# Patient Record
Sex: MALE | Race: WHITE | NOT HISPANIC OR LATINO
[De-identification: names, ages, dates, MRNs, and addresses within clinical notes are randomized per-mention and may not be internally consistent; named-entity substitution may affect disease eponyms.]

---

## 2017-05-13 ENCOUNTER — RX RENEWAL (OUTPATIENT)
Age: 27
End: 2017-05-13

## 2017-08-19 ENCOUNTER — RX RENEWAL (OUTPATIENT)
Age: 27
End: 2017-08-19

## 2018-03-09 ENCOUNTER — EMERGENCY (EMERGENCY)
Facility: HOSPITAL | Age: 28
LOS: 1 days | Discharge: HOME | End: 2018-03-09
Admitting: EMERGENCY MEDICINE
Payer: COMMERCIAL

## 2018-03-09 PROCEDURE — 36415 COLL VENOUS BLD VENIPUNCTURE: CPT

## 2018-03-09 PROCEDURE — 87389 HIV-1 AG W/HIV-1&-2 AB AG IA: CPT

## 2018-03-09 PROCEDURE — 93005 ELECTROCARDIOGRAM TRACING: CPT

## 2018-03-09 PROCEDURE — 82150 ASSAY OF AMYLASE: CPT

## 2018-03-09 PROCEDURE — 80307 DRUG TEST PRSMV CHEM ANLYZR: CPT

## 2018-03-09 PROCEDURE — 85730 THROMBOPLASTIN TIME PARTIAL: CPT

## 2018-03-09 PROCEDURE — 81003 URINALYSIS AUTO W/O SCOPE: CPT

## 2018-03-09 PROCEDURE — 80053 COMPREHEN METABOLIC PANEL: CPT

## 2018-03-09 PROCEDURE — 99284 EMERGENCY DEPT VISIT MOD MDM: CPT | Mod: 25

## 2018-03-09 PROCEDURE — 85027 COMPLETE CBC AUTOMATED: CPT

## 2018-03-09 PROCEDURE — 86703 HIV-1/HIV-2 1 RESULT ANTBDY: CPT

## 2018-03-09 PROCEDURE — 80074 ACUTE HEPATITIS PANEL: CPT

## 2018-03-09 PROCEDURE — 99285 EMERGENCY DEPT VISIT HI MDM: CPT | Mod: 25

## 2018-03-09 PROCEDURE — 82550 ASSAY OF CK (CPK): CPT

## 2018-03-09 PROCEDURE — 85610 PROTHROMBIN TIME: CPT

## 2018-03-09 PROCEDURE — 71045 X-RAY EXAM CHEST 1 VIEW: CPT

## 2018-03-09 PROCEDURE — 83605 ASSAY OF LACTIC ACID: CPT

## 2018-03-09 PROCEDURE — 70450 CT HEAD/BRAIN W/O DYE: CPT

## 2018-03-09 PROCEDURE — 83690 ASSAY OF LIPASE: CPT

## 2018-03-09 PROCEDURE — 93010 ELECTROCARDIOGRAM REPORT: CPT

## 2018-03-09 PROCEDURE — 71045 X-RAY EXAM CHEST 1 VIEW: CPT | Mod: 26

## 2018-03-09 PROCEDURE — 70450 CT HEAD/BRAIN W/O DYE: CPT | Mod: 26

## 2018-03-09 PROCEDURE — 82962 GLUCOSE BLOOD TEST: CPT

## 2018-03-12 ENCOUNTER — APPOINTMENT (OUTPATIENT)
Dept: FAMILY MEDICINE | Facility: CLINIC | Age: 28
End: 2018-03-12
Payer: COMMERCIAL

## 2018-03-12 VITALS
DIASTOLIC BLOOD PRESSURE: 70 MMHG | BODY MASS INDEX: 25.71 KG/M2 | HEIGHT: 66 IN | WEIGHT: 160 LBS | RESPIRATION RATE: 20 BRPM | SYSTOLIC BLOOD PRESSURE: 124 MMHG | HEART RATE: 68 BPM

## 2018-03-12 PROCEDURE — 99213 OFFICE O/P EST LOW 20 MIN: CPT | Mod: 25

## 2018-03-12 PROCEDURE — 36415 COLL VENOUS BLD VENIPUNCTURE: CPT

## 2018-03-13 LAB
25(OH)D3 SERPL-MCNC: 16.6 NG/ML
T3FREE SERPL-MCNC: 2.33 PG/ML
T4 FREE SERPL-MCNC: 1.6 NG/DL
TSH SERPL-ACNC: 0.38 UIU/ML

## 2018-03-16 ENCOUNTER — OUTPATIENT (OUTPATIENT)
Dept: OUTPATIENT SERVICES | Facility: HOSPITAL | Age: 28
LOS: 1 days | End: 2018-03-16
Payer: COMMERCIAL

## 2018-03-16 DIAGNOSIS — Z00.00 ENCOUNTER FOR GENERAL ADULT MEDICAL EXAMINATION WITHOUT ABNORMAL FINDINGS: ICD-10-CM

## 2018-03-16 PROCEDURE — 95812 EEG 41-60 MINUTES: CPT

## 2018-03-20 ENCOUNTER — OUTPATIENT (OUTPATIENT)
Dept: OUTPATIENT SERVICES | Facility: HOSPITAL | Age: 28
LOS: 1 days | End: 2018-03-20
Payer: COMMERCIAL

## 2018-03-20 ENCOUNTER — APPOINTMENT (OUTPATIENT)
Dept: MRI IMAGING | Facility: HOSPITAL | Age: 28
End: 2018-03-20
Payer: COMMERCIAL

## 2018-03-20 DIAGNOSIS — Z00.8 ENCOUNTER FOR OTHER GENERAL EXAMINATION: ICD-10-CM

## 2018-03-20 PROCEDURE — 70553 MRI BRAIN STEM W/O & W/DYE: CPT | Mod: 26

## 2018-03-20 PROCEDURE — 70553 MRI BRAIN STEM W/O & W/DYE: CPT

## 2018-03-20 PROCEDURE — A9585: CPT

## 2018-03-30 ENCOUNTER — APPOINTMENT (OUTPATIENT)
Dept: NEUROLOGY | Facility: CLINIC | Age: 28
End: 2018-03-30

## 2018-08-25 ENCOUNTER — EMERGENCY (EMERGENCY)
Facility: HOSPITAL | Age: 28
LOS: 1 days | Discharge: HOME | End: 2018-08-25
Attending: INTERNAL MEDICINE | Admitting: INTERNAL MEDICINE
Payer: COMMERCIAL

## 2018-08-25 VITALS
TEMPERATURE: 98 F | RESPIRATION RATE: 18 BRPM | SYSTOLIC BLOOD PRESSURE: 127 MMHG | OXYGEN SATURATION: 98 % | HEART RATE: 115 BPM | DIASTOLIC BLOOD PRESSURE: 79 MMHG

## 2018-08-25 VITALS
DIASTOLIC BLOOD PRESSURE: 85 MMHG | OXYGEN SATURATION: 99 % | SYSTOLIC BLOOD PRESSURE: 117 MMHG | TEMPERATURE: 98 F | RESPIRATION RATE: 17 BRPM

## 2018-08-25 DIAGNOSIS — R56.9 UNSPECIFIED CONVULSIONS: ICD-10-CM

## 2018-08-25 LAB
ALBUMIN SERPL ELPH-MCNC: 4.3 G/DL — SIGNIFICANT CHANGE UP (ref 3.3–5)
ALP SERPL-CCNC: 62 U/L — SIGNIFICANT CHANGE UP (ref 40–120)
ALT FLD-CCNC: 26 U/L DA — SIGNIFICANT CHANGE UP (ref 10–45)
ANION GAP SERPL CALC-SCNC: 9 MMOL/L — SIGNIFICANT CHANGE UP (ref 5–17)
APTT BLD: 28.7 SEC — SIGNIFICANT CHANGE UP (ref 27.5–37.4)
AST SERPL-CCNC: 22 U/L — SIGNIFICANT CHANGE UP (ref 10–40)
BASOPHILS # BLD AUTO: 0.1 K/UL — SIGNIFICANT CHANGE UP (ref 0–0.2)
BASOPHILS NFR BLD AUTO: 1.1 % — SIGNIFICANT CHANGE UP (ref 0–2)
BILIRUB SERPL-MCNC: 0.8 MG/DL — SIGNIFICANT CHANGE UP (ref 0.2–1.2)
BUN SERPL-MCNC: 16 MG/DL — SIGNIFICANT CHANGE UP (ref 7–23)
CALCIUM SERPL-MCNC: 10.1 MG/DL — SIGNIFICANT CHANGE UP (ref 8.4–10.5)
CHLORIDE SERPL-SCNC: 102 MMOL/L — SIGNIFICANT CHANGE UP (ref 96–108)
CO2 SERPL-SCNC: 27 MMOL/L — SIGNIFICANT CHANGE UP (ref 22–31)
CREAT SERPL-MCNC: 1.01 MG/DL — SIGNIFICANT CHANGE UP (ref 0.5–1.3)
EOSINOPHIL # BLD AUTO: 0 K/UL — SIGNIFICANT CHANGE UP (ref 0–0.5)
EOSINOPHIL NFR BLD AUTO: 0.3 % — SIGNIFICANT CHANGE UP (ref 0–6)
GLUCOSE BLDC GLUCOMTR-MCNC: 94 MG/DL — SIGNIFICANT CHANGE UP (ref 70–99)
GLUCOSE SERPL-MCNC: 111 MG/DL — HIGH (ref 70–99)
HCT VFR BLD CALC: 45.1 % — SIGNIFICANT CHANGE UP (ref 39–50)
HGB BLD-MCNC: 15.1 G/DL — SIGNIFICANT CHANGE UP (ref 13–17)
INR BLD: 1.12 RATIO — SIGNIFICANT CHANGE UP (ref 0.88–1.16)
LYMPHOCYTES # BLD AUTO: 17.4 % — SIGNIFICANT CHANGE UP (ref 13–44)
LYMPHOCYTES # BLD AUTO: 2.1 K/UL — SIGNIFICANT CHANGE UP (ref 1–3.3)
MCHC RBC-ENTMCNC: 30 PG — SIGNIFICANT CHANGE UP (ref 27–34)
MCHC RBC-ENTMCNC: 33.4 GM/DL — SIGNIFICANT CHANGE UP (ref 32–36)
MCV RBC AUTO: 89.6 FL — SIGNIFICANT CHANGE UP (ref 80–100)
MONOCYTES # BLD AUTO: 0.6 K/UL — SIGNIFICANT CHANGE UP (ref 0–0.9)
MONOCYTES NFR BLD AUTO: 4.7 % — SIGNIFICANT CHANGE UP (ref 2–14)
NEUTROPHILS # BLD AUTO: 9.3 K/UL — HIGH (ref 1.8–7.4)
NEUTROPHILS NFR BLD AUTO: 76.4 % — SIGNIFICANT CHANGE UP (ref 43–77)
PLATELET # BLD AUTO: 228 K/UL — SIGNIFICANT CHANGE UP (ref 150–400)
POTASSIUM SERPL-MCNC: 4.1 MMOL/L — SIGNIFICANT CHANGE UP (ref 3.5–5.3)
POTASSIUM SERPL-SCNC: 4.1 MMOL/L — SIGNIFICANT CHANGE UP (ref 3.5–5.3)
PROT SERPL-MCNC: 7.8 G/DL — SIGNIFICANT CHANGE UP (ref 6–8.3)
PROTHROM AB SERPL-ACNC: 12.5 SEC — SIGNIFICANT CHANGE UP (ref 9.8–12.7)
RBC # BLD: 5.03 M/UL — SIGNIFICANT CHANGE UP (ref 4.2–5.8)
RBC # FLD: 12.6 % — SIGNIFICANT CHANGE UP (ref 10.3–14.5)
SODIUM SERPL-SCNC: 138 MMOL/L — SIGNIFICANT CHANGE UP (ref 135–145)
TROPONIN I SERPL-MCNC: <.017 NG/ML — LOW (ref 0.02–0.06)
WBC # BLD: 12.1 K/UL — HIGH (ref 3.8–10.5)
WBC # FLD AUTO: 12.1 K/UL — HIGH (ref 3.8–10.5)

## 2018-08-25 PROCEDURE — 80053 COMPREHEN METABOLIC PANEL: CPT

## 2018-08-25 PROCEDURE — 99284 EMERGENCY DEPT VISIT MOD MDM: CPT | Mod: 25

## 2018-08-25 PROCEDURE — 85610 PROTHROMBIN TIME: CPT

## 2018-08-25 PROCEDURE — 93005 ELECTROCARDIOGRAM TRACING: CPT

## 2018-08-25 PROCEDURE — 84484 ASSAY OF TROPONIN QUANT: CPT

## 2018-08-25 PROCEDURE — 99284 EMERGENCY DEPT VISIT MOD MDM: CPT

## 2018-08-25 PROCEDURE — 82962 GLUCOSE BLOOD TEST: CPT

## 2018-08-25 PROCEDURE — 85027 COMPLETE CBC AUTOMATED: CPT

## 2018-08-25 PROCEDURE — 96365 THER/PROPH/DIAG IV INF INIT: CPT

## 2018-08-25 PROCEDURE — 85730 THROMBOPLASTIN TIME PARTIAL: CPT

## 2018-08-25 PROCEDURE — 93010 ELECTROCARDIOGRAM REPORT: CPT

## 2018-08-25 RX ORDER — SODIUM CHLORIDE 9 MG/ML
1000 INJECTION INTRAMUSCULAR; INTRAVENOUS; SUBCUTANEOUS
Qty: 0 | Refills: 0 | Status: DISCONTINUED | OUTPATIENT
Start: 2018-08-25 | End: 2018-08-29

## 2018-08-25 RX ORDER — LEVOTHYROXINE SODIUM 125 MCG
1 TABLET ORAL
Qty: 0 | Refills: 0 | COMMUNITY

## 2018-08-25 RX ORDER — SODIUM CHLORIDE 9 MG/ML
3 INJECTION INTRAMUSCULAR; INTRAVENOUS; SUBCUTANEOUS ONCE
Qty: 0 | Refills: 0 | Status: COMPLETED | OUTPATIENT
Start: 2018-08-25 | End: 2018-08-25

## 2018-08-25 RX ORDER — LEVETIRACETAM 250 MG/1
1 TABLET, FILM COATED ORAL
Qty: 60 | Refills: 0 | OUTPATIENT
Start: 2018-08-25 | End: 2018-09-23

## 2018-08-25 RX ORDER — LEVETIRACETAM 250 MG/1
1000 TABLET, FILM COATED ORAL ONCE
Qty: 0 | Refills: 0 | Status: COMPLETED | OUTPATIENT
Start: 2018-08-25 | End: 2018-08-25

## 2018-08-25 RX ADMIN — LEVETIRACETAM 1000 MILLIGRAM(S): 250 TABLET, FILM COATED ORAL at 16:00

## 2018-08-25 RX ADMIN — LEVETIRACETAM 400 MILLIGRAM(S): 250 TABLET, FILM COATED ORAL at 15:38

## 2018-08-25 RX ADMIN — SODIUM CHLORIDE 3 MILLILITER(S): 9 INJECTION INTRAMUSCULAR; INTRAVENOUS; SUBCUTANEOUS at 15:01

## 2018-08-25 RX ADMIN — SODIUM CHLORIDE 125 MILLILITER(S): 9 INJECTION INTRAMUSCULAR; INTRAVENOUS; SUBCUTANEOUS at 15:01
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
## 2018-08-25 NOTE — ED ADULT NURSE NOTE - NSIMPLEMENTINTERV_GEN_ALL_ED
Implemented All Universal Safety Interventions:  [place] to call system. Call bell, personal items and telephone within reach. Instruct patient to call for assistance. Room bathroom lighting operational. Non-slip footwear when patient is off stretcher. Physically safe environment: no spills, clutter or unnecessary equipment. Stretcher in lowest position, wheels locked, appropriate side rails in place.

## 2018-08-30 PROBLEM — E07.9 DISORDER OF THYROID, UNSPECIFIED: Chronic | Status: ACTIVE | Noted: 2018-08-25

## 2018-08-31 ENCOUNTER — OUTPATIENT (OUTPATIENT)
Dept: OUTPATIENT SERVICES | Facility: HOSPITAL | Age: 28
LOS: 1 days | End: 2018-08-31
Payer: COMMERCIAL

## 2018-08-31 DIAGNOSIS — R56.9 UNSPECIFIED CONVULSIONS: ICD-10-CM

## 2018-08-31 PROCEDURE — 95812 EEG 41-60 MINUTES: CPT

## 2018-09-04 ENCOUNTER — OUTPATIENT (OUTPATIENT)
Dept: OUTPATIENT SERVICES | Facility: HOSPITAL | Age: 28
LOS: 1 days | End: 2018-09-04
Payer: COMMERCIAL

## 2018-09-04 ENCOUNTER — APPOINTMENT (OUTPATIENT)
Dept: MRI IMAGING | Facility: HOSPITAL | Age: 28
End: 2018-09-04
Payer: COMMERCIAL

## 2018-09-04 DIAGNOSIS — Z00.8 ENCOUNTER FOR OTHER GENERAL EXAMINATION: ICD-10-CM

## 2018-09-04 PROCEDURE — A9579: CPT

## 2018-09-04 PROCEDURE — 70553 MRI BRAIN STEM W/O & W/DYE: CPT | Mod: 26

## 2018-09-04 PROCEDURE — 70553 MRI BRAIN STEM W/O & W/DYE: CPT

## 2018-09-21 ENCOUNTER — APPOINTMENT (OUTPATIENT)
Dept: FAMILY MEDICINE | Facility: CLINIC | Age: 28
End: 2018-09-21
Payer: COMMERCIAL

## 2018-09-21 VITALS
BODY MASS INDEX: 27.32 KG/M2 | HEART RATE: 68 BPM | SYSTOLIC BLOOD PRESSURE: 122 MMHG | DIASTOLIC BLOOD PRESSURE: 70 MMHG | WEIGHT: 170 LBS | HEIGHT: 66 IN | RESPIRATION RATE: 20 BRPM

## 2018-09-21 DIAGNOSIS — E55.9 VITAMIN D DEFICIENCY, UNSPECIFIED: ICD-10-CM

## 2018-09-21 PROCEDURE — 36415 COLL VENOUS BLD VENIPUNCTURE: CPT

## 2018-09-21 PROCEDURE — 99213 OFFICE O/P EST LOW 20 MIN: CPT | Mod: 25

## 2018-09-21 RX ORDER — AMOXICILLIN AND CLAVULANATE POTASSIUM 875; 125 MG/1; MG/1
875-125 TABLET, COATED ORAL
Qty: 14 | Refills: 0 | Status: DISCONTINUED | COMMUNITY
Start: 2018-08-09

## 2018-09-21 NOTE — ASSESSMENT
[FreeTextEntry1] : Stable exam with unremarkable neuro exam and no clinical evidence of thyroid pathology \par Lab profiles sent

## 2018-09-21 NOTE — PHYSICAL EXAM
[No Acute Distress] : no acute distress [Supple] : supple [Thyroid Normal, No Nodules] : the thyroid was normal and there were no nodules present [No Respiratory Distress] : no respiratory distress  [Clear to Auscultation] : lungs were clear to auscultation bilaterally [No Accessory Muscle Use] : no accessory muscle use [Normal Rate] : normal rate  [Regular Rhythm] : with a regular rhythm [Normal S1, S2] : normal S1 and S2 [No Murmur] : no murmur heard [No Edema] : there was no peripheral edema [Soft] : abdomen soft [Non Tender] : non-tender [Normal Gait] : normal gait [Coordination Grossly Intact] : coordination grossly intact [No Focal Deficits] : no focal deficits [Alert and Oriented x3] : oriented to person, place, and time

## 2018-09-21 NOTE — HISTORY OF PRESENT ILLNESS
[de-identified] : Presents for BP check, labs, and general follow-up.  Reviewed medication - note upcoming appointment with Neuro - states has not had recurrent seizure since last episode.  No new complaints.

## 2018-09-22 ENCOUNTER — RESULT CHARGE (OUTPATIENT)
Age: 28
End: 2018-09-22

## 2018-09-22 DIAGNOSIS — E55.9 VITAMIN D DEFICIENCY, UNSPECIFIED: ICD-10-CM

## 2018-09-22 LAB
25(OH)D3 SERPL-MCNC: 22.7 NG/ML
ALBUMIN SERPL ELPH-MCNC: 4.9 G/DL
ALP BLD-CCNC: 71 U/L
ALT SERPL-CCNC: 17 U/L
ANION GAP SERPL CALC-SCNC: 15 MMOL/L
AST SERPL-CCNC: 17 U/L
BASOPHILS # BLD AUTO: 0.03 K/UL
BASOPHILS NFR BLD AUTO: 0.2 %
BILIRUB SERPL-MCNC: 0.3 MG/DL
BUN SERPL-MCNC: 12 MG/DL
CALCIUM SERPL-MCNC: 9.8 MG/DL
CHLORIDE SERPL-SCNC: 103 MMOL/L
CHOLEST SERPL-MCNC: 162 MG/DL
CHOLEST/HDLC SERPL: 3.8 RATIO
CO2 SERPL-SCNC: 25 MMOL/L
CREAT SERPL-MCNC: 0.8 MG/DL
EOSINOPHIL # BLD AUTO: 0.11 K/UL
EOSINOPHIL NFR BLD AUTO: 0.9 %
FOLATE SERPL-MCNC: 13.2 NG/ML
GLUCOSE SERPL-MCNC: 97 MG/DL
HBA1C MFR BLD HPLC: 5.5 %
HCT VFR BLD CALC: 43.4 %
HDLC SERPL-MCNC: 43 MG/DL
HGB BLD-MCNC: 15.1 G/DL
IMM GRANULOCYTES NFR BLD AUTO: 0.3 %
LDLC SERPL CALC-MCNC: 91 MG/DL
LYMPHOCYTES # BLD AUTO: 2.89 K/UL
LYMPHOCYTES NFR BLD AUTO: 23.6 %
MAN DIFF?: NORMAL
MCHC RBC-ENTMCNC: 30.6 PG
MCHC RBC-ENTMCNC: 34.8 GM/DL
MCV RBC AUTO: 88 FL
MONOCYTES # BLD AUTO: 1.17 K/UL
MONOCYTES NFR BLD AUTO: 9.6 %
NEUTROPHILS # BLD AUTO: 8 K/UL
NEUTROPHILS NFR BLD AUTO: 65.4 %
PLATELET # BLD AUTO: 231 K/UL
POTASSIUM SERPL-SCNC: 4.2 MMOL/L
PROT SERPL-MCNC: 7.1 G/DL
RBC # BLD: 4.93 M/UL
RBC # FLD: 14.9 %
SODIUM SERPL-SCNC: 143 MMOL/L
T4 FREE SERPL-MCNC: 2.4 NG/DL
TRIGL SERPL-MCNC: 140 MG/DL
TSH SERPL-ACNC: 0.02 UIU/ML
VIT B12 SERPL-MCNC: 839 PG/ML
WBC # FLD AUTO: 12.24 K/UL

## 2018-09-24 LAB — LEVETIRACETAM SERPL-MCNC: 7.7 MCG/ML

## 2019-09-13 ENCOUNTER — APPOINTMENT (OUTPATIENT)
Dept: FAMILY MEDICINE | Facility: CLINIC | Age: 29
End: 2019-09-13
Payer: COMMERCIAL

## 2019-09-13 VITALS
SYSTOLIC BLOOD PRESSURE: 115 MMHG | HEART RATE: 68 BPM | RESPIRATION RATE: 20 BRPM | WEIGHT: 178 LBS | HEIGHT: 66 IN | DIASTOLIC BLOOD PRESSURE: 65 MMHG | BODY MASS INDEX: 28.61 KG/M2

## 2019-09-13 DIAGNOSIS — Z00.00 ENCOUNTER FOR GENERAL ADULT MEDICAL EXAMINATION W/OUT ABNORMAL FINDINGS: ICD-10-CM

## 2019-09-13 PROCEDURE — 99214 OFFICE O/P EST MOD 30 MIN: CPT | Mod: 25

## 2019-09-13 PROCEDURE — G0008: CPT

## 2019-09-13 PROCEDURE — 90674 CCIIV4 VAC NO PRSV 0.5 ML IM: CPT

## 2019-09-13 PROCEDURE — 36415 COLL VENOUS BLD VENIPUNCTURE: CPT

## 2019-09-13 NOTE — HISTORY OF PRESENT ILLNESS
[Discussed Risks and Advised to Quit Smoking] : Discussed risks and advised to quit smoking [Ready] : Patient is ready for cessation intervention [de-identified] : Presents for BP check, labs, and general follow-up.  States scheduled for Neuro F/U upcoming.  Denies new complaints.  Trying to watch diet and exercise as much as possible (states has new job which is more demanding of his schedule).  Again discussed smoking cessation - pt actively trying to reduce with the goal of total cessation.  Reviewed immunizations - due for flu vaccine - pt in agreement.  Also discussed HIV screening and pt is amenable.

## 2019-09-13 NOTE — PHYSICAL EXAM
[No Acute Distress] : no acute distress [Normal] : normal rate, regular rhythm, normal S1 and S2 and no murmur heard [No Edema] : there was no peripheral edema [Soft] : abdomen soft [Non Tender] : non-tender [Coordination Grossly Intact] : coordination grossly intact [No Focal Deficits] : no focal deficits [Normal Gait] : normal gait [Alert and Oriented x3] : oriented to person, place, and time

## 2019-09-13 NOTE — HEALTH RISK ASSESSMENT
[] : Yes [Yes] : Yes [Monthly or less (1 pt)] : Monthly or less (1 point) [1 or 2 (0 pts)] : 1 or 2 (0 points) [Never (0 pts)] : Never (0 points) [No] : In the past 12 months have you used drugs other than those required for medical reasons? No [0] : 2) Feeling down, depressed, or hopeless: Not at all (0) [Audit-CScore] : 1

## 2019-09-13 NOTE — COUNSELING
[Risk of tobacco use and health benefits of smoking cessation discussed] : Risk of tobacco use and health benefits of smoking cessation discussed [Cessation strategies including cessation program discussed] : Cessation strategies including cessation program discussed [Willing to Quit Smoking] : Willing to quit smoking [de-identified] : healthy eating and activities [None] : None [Good understanding] : Patient has a good understanding of lifestyle changes and steps needed to achieve self management goal

## 2019-09-16 ENCOUNTER — RESULT REVIEW (OUTPATIENT)
Age: 29
End: 2019-09-16

## 2019-09-16 LAB
25(OH)D3 SERPL-MCNC: 26.3 NG/ML
ALBUMIN SERPL ELPH-MCNC: 5 G/DL
ALP BLD-CCNC: 68 U/L
ALT SERPL-CCNC: 23 U/L
ANION GAP SERPL CALC-SCNC: 15 MMOL/L
AST SERPL-CCNC: 19 U/L
BASOPHILS # BLD AUTO: 0.05 K/UL
BASOPHILS NFR BLD AUTO: 0.7 %
BILIRUB SERPL-MCNC: 0.7 MG/DL
BUN SERPL-MCNC: 11 MG/DL
CALCIUM SERPL-MCNC: 10 MG/DL
CHLORIDE SERPL-SCNC: 101 MMOL/L
CHOLEST SERPL-MCNC: 211 MG/DL
CHOLEST/HDLC SERPL: 4.7 RATIO
CO2 SERPL-SCNC: 24 MMOL/L
CREAT SERPL-MCNC: 0.78 MG/DL
EOSINOPHIL # BLD AUTO: 0.14 K/UL
EOSINOPHIL NFR BLD AUTO: 1.8 %
ESTIMATED AVERAGE GLUCOSE: 114 MG/DL
FOLATE SERPL-MCNC: 16.4 NG/ML
GLUCOSE SERPL-MCNC: 115 MG/DL
HBA1C MFR BLD HPLC: 5.6 %
HCT VFR BLD CALC: 48.9 %
HDLC SERPL-MCNC: 45 MG/DL
HGB BLD-MCNC: 16.2 G/DL
HIV1+2 AB SPEC QL IA.RAPID: NONREACTIVE
IMM GRANULOCYTES NFR BLD AUTO: 0.5 %
LDLC SERPL CALC-MCNC: 143 MG/DL
LEVETIRACETAM SERPL-MCNC: 18.9 MCG/ML
LYMPHOCYTES # BLD AUTO: 2.48 K/UL
LYMPHOCYTES NFR BLD AUTO: 32.5 %
MAN DIFF?: NORMAL
MCHC RBC-ENTMCNC: 29 PG
MCHC RBC-ENTMCNC: 33.1 GM/DL
MCV RBC AUTO: 87.5 FL
MONOCYTES # BLD AUTO: 0.64 K/UL
MONOCYTES NFR BLD AUTO: 8.4 %
NEUTROPHILS # BLD AUTO: 4.29 K/UL
NEUTROPHILS NFR BLD AUTO: 56.1 %
PLATELET # BLD AUTO: 276 K/UL
POTASSIUM SERPL-SCNC: 4.6 MMOL/L
PROT SERPL-MCNC: 7.5 G/DL
RBC # BLD: 5.59 M/UL
RBC # FLD: 13.9 %
SODIUM SERPL-SCNC: 140 MMOL/L
T4 FREE SERPL-MCNC: 2.1 NG/DL
TRIGL SERPL-MCNC: 117 MG/DL
TSH SERPL-ACNC: 0.86 UIU/ML
VIT B12 SERPL-MCNC: 861 PG/ML
WBC # FLD AUTO: 7.64 K/UL

## 2020-09-22 ENCOUNTER — APPOINTMENT (OUTPATIENT)
Dept: FAMILY MEDICINE | Facility: CLINIC | Age: 30
End: 2020-09-22
Payer: COMMERCIAL

## 2020-09-22 VITALS
HEIGHT: 66 IN | BODY MASS INDEX: 29.09 KG/M2 | WEIGHT: 181 LBS | SYSTOLIC BLOOD PRESSURE: 115 MMHG | RESPIRATION RATE: 20 BRPM | DIASTOLIC BLOOD PRESSURE: 70 MMHG | HEART RATE: 68 BPM

## 2020-09-22 PROCEDURE — G0008: CPT

## 2020-09-22 PROCEDURE — 90686 IIV4 VACC NO PRSV 0.5 ML IM: CPT

## 2020-09-22 PROCEDURE — 99214 OFFICE O/P EST MOD 30 MIN: CPT | Mod: 25

## 2020-09-22 PROCEDURE — 36415 COLL VENOUS BLD VENIPUNCTURE: CPT

## 2020-09-22 NOTE — HISTORY OF PRESENT ILLNESS
[de-identified] : Presents for BP check, labs, and general follow-up; also requests flu vaccine at this encounter.  States feeling well; trying to watch diet; remains seizure-free - had a telemedicine visit with Neurology several months ago - all stable.

## 2020-09-22 NOTE — ASSESSMENT
[FreeTextEntry1] : Hemodynamically stable with acceptable BP\par No neuro deficits noted\par No clinical evidence of thyroid dysfunction\par Lab profiles drawn in office and sent\par Flu vaccine given L deltoid

## 2020-09-24 LAB
ALBUMIN SERPL ELPH-MCNC: 5.2 G/DL
ALP BLD-CCNC: 73 U/L
ALT SERPL-CCNC: 35 U/L
ANION GAP SERPL CALC-SCNC: 13 MMOL/L
AST SERPL-CCNC: 21 U/L
BASOPHILS # BLD AUTO: 0.06 K/UL
BASOPHILS NFR BLD AUTO: 0.6 %
BILIRUB SERPL-MCNC: 0.5 MG/DL
BUN SERPL-MCNC: 14 MG/DL
CALCIUM SERPL-MCNC: 10.2 MG/DL
CHLORIDE SERPL-SCNC: 101 MMOL/L
CHOLEST SERPL-MCNC: 204 MG/DL
CHOLEST/HDLC SERPL: 5 RATIO
CO2 SERPL-SCNC: 24 MMOL/L
CREAT SERPL-MCNC: 0.85 MG/DL
EOSINOPHIL # BLD AUTO: 0.17 K/UL
EOSINOPHIL NFR BLD AUTO: 1.6 %
ESTIMATED AVERAGE GLUCOSE: 111 MG/DL
FOLATE SERPL-MCNC: 12.7 NG/ML
GLUCOSE SERPL-MCNC: 92 MG/DL
HBA1C MFR BLD HPLC: 5.5 %
HCT VFR BLD CALC: 48.8 %
HDLC SERPL-MCNC: 41 MG/DL
HGB BLD-MCNC: 16.2 G/DL
IMM GRANULOCYTES NFR BLD AUTO: 0.5 %
LDLC SERPL CALC-MCNC: 140 MG/DL
LYMPHOCYTES # BLD AUTO: 3.56 K/UL
LYMPHOCYTES NFR BLD AUTO: 34.3 %
MAN DIFF?: NORMAL
MCHC RBC-ENTMCNC: 29.9 PG
MCHC RBC-ENTMCNC: 33.2 GM/DL
MCV RBC AUTO: 90.2 FL
MONOCYTES # BLD AUTO: 0.93 K/UL
MONOCYTES NFR BLD AUTO: 9 %
NEUTROPHILS # BLD AUTO: 5.62 K/UL
NEUTROPHILS NFR BLD AUTO: 54 %
PLATELET # BLD AUTO: 273 K/UL
POTASSIUM SERPL-SCNC: 4.4 MMOL/L
PROT SERPL-MCNC: 7.5 G/DL
RBC # BLD: 5.41 M/UL
RBC # FLD: 14.1 %
SODIUM SERPL-SCNC: 138 MMOL/L
T4 FREE SERPL-MCNC: 2.1 NG/DL
TRIGL SERPL-MCNC: 118 MG/DL
TSH SERPL-ACNC: 0.27 UIU/ML
VIT B12 SERPL-MCNC: 949 PG/ML
WBC # FLD AUTO: 10.39 K/UL

## 2020-09-25 LAB — LEVETIRACETAM SERPL-MCNC: 8.8 MCG/ML

## 2021-03-09 ENCOUNTER — APPOINTMENT (OUTPATIENT)
Dept: FAMILY MEDICINE | Facility: CLINIC | Age: 31
End: 2021-03-09
Payer: COMMERCIAL

## 2021-03-09 VITALS
HEIGHT: 66 IN | BODY MASS INDEX: 28.93 KG/M2 | WEIGHT: 180 LBS | DIASTOLIC BLOOD PRESSURE: 70 MMHG | HEART RATE: 68 BPM | SYSTOLIC BLOOD PRESSURE: 115 MMHG | RESPIRATION RATE: 20 BRPM

## 2021-03-09 DIAGNOSIS — F17.200 NICOTINE DEPENDENCE, UNSPECIFIED, UNCOMPLICATED: ICD-10-CM

## 2021-03-09 PROCEDURE — 99214 OFFICE O/P EST MOD 30 MIN: CPT | Mod: 25

## 2021-03-09 PROCEDURE — 99072 ADDL SUPL MATRL&STAF TM PHE: CPT

## 2021-03-09 PROCEDURE — 36415 COLL VENOUS BLD VENIPUNCTURE: CPT

## 2021-03-09 NOTE — COUNSELING
[Risk of tobacco use and health benefits of smoking cessation discussed] : Risk of tobacco use and health benefits of smoking cessation discussed [Cessation strategies including cessation program discussed] : Cessation strategies including cessation program discussed [Willing to Quit Smoking] : Willing to quit smoking [de-identified] : Healthy eating and activities [None] : None [Good understanding] : Patient has a good understanding of lifestyle changes and steps needed to achieve self management goal

## 2021-03-09 NOTE — ASSESSMENT
[FreeTextEntry1] : Hemodynamically stable with acceptable BP\par Neuro status stable with no deficits appreciated\par Lab profiles drawn in office and sent

## 2021-03-09 NOTE — HEALTH RISK ASSESSMENT
[] : Yes [Yes] : Yes [Monthly or less (1 pt)] : Monthly or less (1 point) [1 or 2 (0 pts)] : 1 or 2 (0 points) [Never (0 pts)] : Never (0 points) [No] : In the past 12 months have you used drugs other than those required for medical reasons? No [0] : 2) Feeling down, depressed, or hopeless: Not at all (0) [Patient/Caregiver not ready to engage] : Patient/Caregiver not ready to engage [Audit-CScore] : 1 [AdvancecareDate] : 03/21

## 2021-03-09 NOTE — HISTORY OF PRESENT ILLNESS
[de-identified] : Presents for BP check, labs, and general follow-up.  States feeling generally well; trying to watch diet.  Again discussed smoking cessation - pt states trying to cut down with goal of quitting.  Pt is UTD with Neurology - no new issues.

## 2021-03-10 LAB
25(OH)D3 SERPL-MCNC: 18.7 NG/ML
ALBUMIN SERPL ELPH-MCNC: 5.2 G/DL
ALP BLD-CCNC: 70 U/L
ALT SERPL-CCNC: 27 U/L
ANION GAP SERPL CALC-SCNC: 13 MMOL/L
AST SERPL-CCNC: 24 U/L
BASOPHILS # BLD AUTO: 0.08 K/UL
BASOPHILS NFR BLD AUTO: 0.7 %
BILIRUB SERPL-MCNC: 0.6 MG/DL
BUN SERPL-MCNC: 13 MG/DL
CALCIUM SERPL-MCNC: 10 MG/DL
CHLORIDE SERPL-SCNC: 102 MMOL/L
CHOLEST SERPL-MCNC: 213 MG/DL
CO2 SERPL-SCNC: 25 MMOL/L
CREAT SERPL-MCNC: 0.83 MG/DL
EOSINOPHIL # BLD AUTO: 0.16 K/UL
EOSINOPHIL NFR BLD AUTO: 1.4 %
ESTIMATED AVERAGE GLUCOSE: 111 MG/DL
FOLATE SERPL-MCNC: 10.8 NG/ML
GLUCOSE SERPL-MCNC: 85 MG/DL
HBA1C MFR BLD HPLC: 5.5 %
HCT VFR BLD CALC: 46.5 %
HDLC SERPL-MCNC: 45 MG/DL
HGB BLD-MCNC: 15.6 G/DL
IMM GRANULOCYTES NFR BLD AUTO: 0.3 %
LDLC SERPL CALC-MCNC: 154 MG/DL
LYMPHOCYTES # BLD AUTO: 3.09 K/UL
LYMPHOCYTES NFR BLD AUTO: 26.1 %
MAN DIFF?: NORMAL
MCHC RBC-ENTMCNC: 29.9 PG
MCHC RBC-ENTMCNC: 33.5 GM/DL
MCV RBC AUTO: 89.3 FL
MONOCYTES # BLD AUTO: 0.82 K/UL
MONOCYTES NFR BLD AUTO: 6.9 %
NEUTROPHILS # BLD AUTO: 7.66 K/UL
NEUTROPHILS NFR BLD AUTO: 64.6 %
NONHDLC SERPL-MCNC: 168 MG/DL
PLATELET # BLD AUTO: 257 K/UL
POTASSIUM SERPL-SCNC: 4.2 MMOL/L
PROT SERPL-MCNC: 7.5 G/DL
RBC # BLD: 5.21 M/UL
RBC # FLD: 14.5 %
SODIUM SERPL-SCNC: 140 MMOL/L
T4 FREE SERPL-MCNC: 1.6 NG/DL
TRIGL SERPL-MCNC: 74 MG/DL
TSH SERPL-ACNC: 12.1 UIU/ML
VIT B12 SERPL-MCNC: 960 PG/ML
WBC # FLD AUTO: 11.84 K/UL

## 2021-03-15 LAB — LEVETIRACETAM SERPL-MCNC: 15.8 UG/ML

## 2021-05-06 ENCOUNTER — APPOINTMENT (OUTPATIENT)
Dept: NEUROLOGY | Facility: CLINIC | Age: 31
End: 2021-05-06
Payer: COMMERCIAL

## 2021-05-06 VITALS
DIASTOLIC BLOOD PRESSURE: 76 MMHG | BODY MASS INDEX: 27.64 KG/M2 | HEART RATE: 82 BPM | WEIGHT: 172 LBS | SYSTOLIC BLOOD PRESSURE: 118 MMHG | HEIGHT: 66 IN

## 2021-05-06 VITALS
HEIGHT: 66 IN | SYSTOLIC BLOOD PRESSURE: 118 MMHG | BODY MASS INDEX: 27.64 KG/M2 | WEIGHT: 172 LBS | DIASTOLIC BLOOD PRESSURE: 76 MMHG | HEART RATE: 82 BPM

## 2021-05-06 PROCEDURE — 99215 OFFICE O/P EST HI 40 MIN: CPT

## 2021-05-06 PROCEDURE — 99072 ADDL SUPL MATRL&STAF TM PHE: CPT

## 2021-05-06 NOTE — CONSULT LETTER
[Dear  ___] : Dear  [unfilled], [Courtesy Letter:] : I had the pleasure of seeing your patient, [unfilled], in my office today. [Please see my note below.] : Please see my note below. [Consult Closing:] : Thank you very much for allowing me to participate in the care of this patient.  If you have any questions, please do not hesitate to contact me. [Sincerely,] : Sincerely, [FreeTextEntry3] : Bernardino Loving MD\par

## 2021-05-06 NOTE — ASSESSMENT
[FreeTextEntry1] : Impression: Generalized tonic-clonic seizure disorder possibly with a focal onset of anxiety and generalized paresthesia doing well without recurrence\par \par Recommendations: Continue Keppra 750 mg twice daily.  Office follow-up in 6 months.\par

## 2021-05-06 NOTE — PHYSICAL EXAM
[FreeTextEntry1] : Head:  Normocephalic Neck: Supple nontender no carotid bruits.  \par \par Mental Status:  Alert Oriented X3 Speech normal and no aphasia or dysarthria.\par \par Cranial Nerves:  PERRL, Fundi normal Visual Fields full  EOMI no diplopia no ptosis no nystagmus, V through XII intact.\par \par Motor:  No drift, normal strength tone and coordination and no focal atrophy. No abnormal movements. No dysmetria.  Normal rapid alternating movements. \par \par DTRs: Symmetric and 2+.  Plantars flexor.  No Clonus.\par \par Sensory:  Normal testing with pin light touch and vibration and  Joint position sense.  Normal DSS to touch.\par \par Gait:  Normal including tandem walking heel toe walking and Rhomberg.\par

## 2021-05-06 NOTE — HISTORY OF PRESENT ILLNESS
[FreeTextEntry1] : Van Everett is seen for an office visit.  He is doing well without any recurrent seizures.  He continues to take Keppra 750 mg twice daily without any side effects.  He is pursuing all of his ADL without limitation.\par \par He has had some issues with his thyroid function which is being adjusted.  He has a history of hypothyroidism on levothyroxine.

## 2021-06-14 ENCOUNTER — APPOINTMENT (OUTPATIENT)
Dept: FAMILY MEDICINE | Facility: CLINIC | Age: 31
End: 2021-06-14
Payer: COMMERCIAL

## 2021-06-14 VITALS
HEART RATE: 76 BPM | RESPIRATION RATE: 20 BRPM | DIASTOLIC BLOOD PRESSURE: 68 MMHG | HEIGHT: 66 IN | WEIGHT: 172 LBS | SYSTOLIC BLOOD PRESSURE: 116 MMHG | BODY MASS INDEX: 27.64 KG/M2

## 2021-06-14 PROCEDURE — 99072 ADDL SUPL MATRL&STAF TM PHE: CPT

## 2021-06-14 PROCEDURE — 99214 OFFICE O/P EST MOD 30 MIN: CPT | Mod: 25

## 2021-06-14 PROCEDURE — 36415 COLL VENOUS BLD VENIPUNCTURE: CPT

## 2021-06-14 NOTE — ASSESSMENT
[FreeTextEntry1] : Hemodynamically stable with acceptable BP\par Neuro status stable\par No clinical evidence of thyroid dysfunction\par Lab profiles drawn in office and sent

## 2021-06-16 LAB
25(OH)D3 SERPL-MCNC: 25 NG/ML
ALBUMIN SERPL ELPH-MCNC: 5.2 G/DL
ALP BLD-CCNC: 72 U/L
ALT SERPL-CCNC: 19 U/L
ANION GAP SERPL CALC-SCNC: 12 MMOL/L
AST SERPL-CCNC: 21 U/L
BASOPHILS # BLD AUTO: 0.07 K/UL
BASOPHILS NFR BLD AUTO: 0.8 %
BILIRUB SERPL-MCNC: 0.6 MG/DL
BUN SERPL-MCNC: 13 MG/DL
CALCIUM SERPL-MCNC: 10.1 MG/DL
CHLORIDE SERPL-SCNC: 102 MMOL/L
CHOLEST SERPL-MCNC: 181 MG/DL
CO2 SERPL-SCNC: 26 MMOL/L
CREAT SERPL-MCNC: 0.85 MG/DL
EOSINOPHIL # BLD AUTO: 0.22 K/UL
EOSINOPHIL NFR BLD AUTO: 2.5 %
ESTIMATED AVERAGE GLUCOSE: 114 MG/DL
GLUCOSE SERPL-MCNC: 92 MG/DL
HBA1C MFR BLD HPLC: 5.6 %
HCT VFR BLD CALC: 46.7 %
HDLC SERPL-MCNC: 47 MG/DL
HGB BLD-MCNC: 15.5 G/DL
IMM GRANULOCYTES NFR BLD AUTO: 0.3 %
LDLC SERPL CALC-MCNC: 125 MG/DL
LYMPHOCYTES # BLD AUTO: 2.37 K/UL
LYMPHOCYTES NFR BLD AUTO: 27.4 %
MAN DIFF?: NORMAL
MCHC RBC-ENTMCNC: 30.2 PG
MCHC RBC-ENTMCNC: 33.2 GM/DL
MCV RBC AUTO: 91 FL
MONOCYTES # BLD AUTO: 0.68 K/UL
MONOCYTES NFR BLD AUTO: 7.9 %
NEUTROPHILS # BLD AUTO: 5.28 K/UL
NEUTROPHILS NFR BLD AUTO: 61.1 %
NONHDLC SERPL-MCNC: 134 MG/DL
PLATELET # BLD AUTO: 249 K/UL
POTASSIUM SERPL-SCNC: 4.5 MMOL/L
PROT SERPL-MCNC: 7.5 G/DL
RBC # BLD: 5.13 M/UL
RBC # FLD: 14.3 %
SODIUM SERPL-SCNC: 139 MMOL/L
T3FREE SERPL-MCNC: 2.72 PG/ML
T4 FREE SERPL-MCNC: 1.7 NG/DL
TRIGL SERPL-MCNC: 47 MG/DL
TSH SERPL-ACNC: 8.62 UIU/ML
WBC # FLD AUTO: 8.65 K/UL

## 2021-06-18 ENCOUNTER — NON-APPOINTMENT (OUTPATIENT)
Age: 31
End: 2021-06-18

## 2021-06-18 LAB — LEVETIRACETAM SERPL-MCNC: 15.1 UG/ML

## 2021-11-05 ENCOUNTER — APPOINTMENT (OUTPATIENT)
Dept: NEUROLOGY | Facility: CLINIC | Age: 31
End: 2021-11-05
Payer: COMMERCIAL

## 2021-11-05 VITALS
WEIGHT: 168 LBS | SYSTOLIC BLOOD PRESSURE: 118 MMHG | HEIGHT: 66 IN | HEART RATE: 82 BPM | DIASTOLIC BLOOD PRESSURE: 80 MMHG | BODY MASS INDEX: 27 KG/M2

## 2021-11-05 VITALS
SYSTOLIC BLOOD PRESSURE: 118 MMHG | WEIGHT: 168 LBS | HEART RATE: 82 BPM | HEIGHT: 66 IN | DIASTOLIC BLOOD PRESSURE: 80 MMHG | BODY MASS INDEX: 27 KG/M2

## 2021-11-05 PROCEDURE — 99214 OFFICE O/P EST MOD 30 MIN: CPT

## 2021-11-05 NOTE — ASSESSMENT
[FreeTextEntry1] : Impression: Generalized tonic clonic seizure disorder with focal onset of anxiety and generalized paresthesia doing well without recurrence.  Neurological exam is normal.\par \par Recommendations: Continue Keppra 750 mg twice daily.  Office follow-up in 1 year.  Routine blood test by PCP.\par

## 2021-11-05 NOTE — CONSULT LETTER
[Dear  ___] : Dear  [unfilled], [Consult Letter:] : I had the pleasure of evaluating your patient, [unfilled]. [Please see my note below.] : Please see my note below. [Consult Closing:] : Thank you very much for allowing me to participate in the care of this patient.  If you have any questions, please do not hesitate to contact me. [Sincerely,] : Sincerely, [FreeTextEntry3] : Bernardino Loving MD\par

## 2021-11-05 NOTE — HISTORY OF PRESENT ILLNESS
[FreeTextEntry1] : Van Everett is seen for an office visit.  He is doing well without any recurrent seizures for several years.  He continues taking Keppra 750 mg twice daily without any side effects.  His last serum level was therapeutic at 15.1.  He has been dieting.  He denies any drug or alcohol abuse.  He takes medication for his thyroid condition.

## 2022-05-24 ENCOUNTER — APPOINTMENT (OUTPATIENT)
Dept: FAMILY MEDICINE | Facility: CLINIC | Age: 32
End: 2022-05-24
Payer: COMMERCIAL

## 2022-05-24 VITALS
WEIGHT: 173 LBS | RESPIRATION RATE: 20 BRPM | SYSTOLIC BLOOD PRESSURE: 125 MMHG | HEART RATE: 76 BPM | DIASTOLIC BLOOD PRESSURE: 72 MMHG | HEIGHT: 66 IN | BODY MASS INDEX: 27.8 KG/M2

## 2022-05-24 PROCEDURE — 99214 OFFICE O/P EST MOD 30 MIN: CPT | Mod: 25

## 2022-05-24 PROCEDURE — 36415 COLL VENOUS BLD VENIPUNCTURE: CPT

## 2022-05-24 NOTE — HISTORY OF PRESENT ILLNESS
[de-identified] : Presents for BP check, labs, and general follow-up.  States feeling well; denies new seizure activity; UTD with Neurology.  Trying to watch diet and remain active; also note cutting back on smoking with goal of quitting - now down to a pack every 4 days.

## 2022-05-24 NOTE — ASSESSMENT
[FreeTextEntry1] : Hemodynamically stable with acceptable BP\par No new neuro deficits appreciated\par Lab profiles drawn in office and sent

## 2022-05-26 LAB
25(OH)D3 SERPL-MCNC: 16.9 NG/ML
ALBUMIN SERPL ELPH-MCNC: 5.3 G/DL
ALP BLD-CCNC: 70 U/L
ALT SERPL-CCNC: 26 U/L
ANION GAP SERPL CALC-SCNC: 12 MMOL/L
AST SERPL-CCNC: 23 U/L
BASOPHILS # BLD AUTO: 0.06 K/UL
BASOPHILS NFR BLD AUTO: 0.5 %
BILIRUB SERPL-MCNC: 0.4 MG/DL
BUN SERPL-MCNC: 13 MG/DL
CALCIUM SERPL-MCNC: 9.9 MG/DL
CHLORIDE SERPL-SCNC: 101 MMOL/L
CHOLEST SERPL-MCNC: 211 MG/DL
CO2 SERPL-SCNC: 28 MMOL/L
CREAT SERPL-MCNC: 0.8 MG/DL
EGFR: 120 ML/MIN/1.73M2
EOSINOPHIL # BLD AUTO: 0.15 K/UL
EOSINOPHIL NFR BLD AUTO: 1.3 %
FOLATE SERPL-MCNC: 8.7 NG/ML
GLUCOSE SERPL-MCNC: 86 MG/DL
HCT VFR BLD CALC: 47.6 %
HDLC SERPL-MCNC: 49 MG/DL
HGB BLD-MCNC: 15.9 G/DL
IMM GRANULOCYTES NFR BLD AUTO: 0.4 %
LDLC SERPL CALC-MCNC: 136 MG/DL
LYMPHOCYTES # BLD AUTO: 3.01 K/UL
LYMPHOCYTES NFR BLD AUTO: 25.4 %
MAN DIFF?: NORMAL
MCHC RBC-ENTMCNC: 30.2 PG
MCHC RBC-ENTMCNC: 33.4 GM/DL
MCV RBC AUTO: 90.3 FL
MONOCYTES # BLD AUTO: 0.91 K/UL
MONOCYTES NFR BLD AUTO: 7.7 %
NEUTROPHILS # BLD AUTO: 7.65 K/UL
NEUTROPHILS NFR BLD AUTO: 64.7 %
NONHDLC SERPL-MCNC: 162 MG/DL
PLATELET # BLD AUTO: 254 K/UL
POTASSIUM SERPL-SCNC: 4.6 MMOL/L
PROT SERPL-MCNC: 7.5 G/DL
RBC # BLD: 5.27 M/UL
RBC # FLD: 14.6 %
SODIUM SERPL-SCNC: 141 MMOL/L
T4 FREE SERPL-MCNC: 1.4 NG/DL
TRIGL SERPL-MCNC: 130 MG/DL
TSH SERPL-ACNC: 19.1 UIU/ML
VIT B12 SERPL-MCNC: 895 PG/ML
WBC # FLD AUTO: 11.83 K/UL

## 2022-05-26 RX ORDER — ERGOCALCIFEROL 1.25 MG/1
1.25 MG CAPSULE, LIQUID FILLED ORAL
Qty: 12 | Refills: 3 | Status: ACTIVE | COMMUNITY
Start: 2018-09-22 | End: 1900-01-01

## 2022-05-27 LAB — LEVETIRACETAM SERPL-MCNC: 15 UG/ML

## 2022-05-30 ENCOUNTER — NON-APPOINTMENT (OUTPATIENT)
Age: 32
End: 2022-05-30

## 2022-11-07 ENCOUNTER — APPOINTMENT (OUTPATIENT)
Dept: NEUROLOGY | Facility: CLINIC | Age: 32
End: 2022-11-07

## 2022-11-07 VITALS
BODY MASS INDEX: 27.8 KG/M2 | DIASTOLIC BLOOD PRESSURE: 80 MMHG | WEIGHT: 173 LBS | HEIGHT: 66 IN | HEART RATE: 80 BPM | SYSTOLIC BLOOD PRESSURE: 124 MMHG

## 2022-11-07 PROCEDURE — 99213 OFFICE O/P EST LOW 20 MIN: CPT

## 2022-11-07 NOTE — ASSESSMENT
[FreeTextEntry1] : Impression: This 32-year-old male patient has a general tonic-clonic seizure disorder with focal onset of anxiety and generalized paresthesia doing well without recurrence.\par \par Recommendations: Maintain Keppra 750 mg twice daily.  Office follow-up in 1 year.  Routine blood tests including Keppra serum level by PCP.\par

## 2022-11-07 NOTE — PHYSICAL EXAM
[FreeTextEntry1] : Head:  Normocephalic Neck: Supple nontender no carotid bruits. \par \par Mental Status:  Alert Oriented X3 Speech normal and no aphasia or dysarthria.\par \par Cranial Nerves:  PERRL,  Visual Fields full  EOMI no diplopia no ptosis no nystagmus, V through XII intact.\par \par Motor:  No drift, normal strength tone and coordination and no focal atrophy. No abnormal movements. No dysmetria.  Normal rapid alternating movements. \par \par DTRs: Symmetric and 2+.  Plantars flexor.  No Clonus.\par \par Sensory: Unremarkable.\par \par Gait:  Normal including tandem walking heel toe walking and Rhomberg.\par

## 2022-11-07 NOTE — HISTORY OF PRESENT ILLNESS
[FreeTextEntry1] : Van  is seen for an office visit.  He continues to do well without any recurrent seizures.  He continues to take Keppra 702 mg twice daily without any side effects.  His last serum on 5/24/2022 was 15 which is in the therapeutic range.  He denies side effects from medication.  Takes medication for thyroid and he also receives vitamin D.

## 2022-11-11 ENCOUNTER — APPOINTMENT (OUTPATIENT)
Dept: FAMILY MEDICINE | Facility: CLINIC | Age: 32
End: 2022-11-11

## 2023-03-03 ENCOUNTER — APPOINTMENT (OUTPATIENT)
Dept: FAMILY MEDICINE | Facility: CLINIC | Age: 33
End: 2023-03-03
Payer: COMMERCIAL

## 2023-03-03 VITALS
BODY MASS INDEX: 26.2 KG/M2 | WEIGHT: 163 LBS | HEART RATE: 76 BPM | RESPIRATION RATE: 20 BRPM | SYSTOLIC BLOOD PRESSURE: 124 MMHG | HEIGHT: 66 IN | DIASTOLIC BLOOD PRESSURE: 70 MMHG

## 2023-03-03 DIAGNOSIS — Z23 ENCOUNTER FOR IMMUNIZATION: ICD-10-CM

## 2023-03-03 PROCEDURE — 90686 IIV4 VACC NO PRSV 0.5 ML IM: CPT

## 2023-03-03 PROCEDURE — 99214 OFFICE O/P EST MOD 30 MIN: CPT | Mod: 25

## 2023-03-03 PROCEDURE — 36415 COLL VENOUS BLD VENIPUNCTURE: CPT

## 2023-03-03 PROCEDURE — G0008: CPT

## 2023-03-03 NOTE — HISTORY OF PRESENT ILLNESS
[de-identified] : Presents for BP check, labs, and general follow-up.  States trying to eat healthier and exercise more - note very desirable wt loss.  Denies new seizure activity.  Also due for current flu vaccine - pt in agreement.

## 2023-03-03 NOTE — ASSESSMENT
[FreeTextEntry1] : Hemodynamically stable with acceptable BP\par No clinical evidence of thyroid dysfunction\par Neuro status stable\par Lab profiles drawn in office and sent\par Flu vaccine given L deltoid

## 2023-03-04 LAB
ALBUMIN SERPL ELPH-MCNC: 4.9 G/DL
ALP BLD-CCNC: 65 U/L
ALT SERPL-CCNC: 20 U/L
ANION GAP SERPL CALC-SCNC: 10 MMOL/L
AST SERPL-CCNC: 19 U/L
BASOPHILS # BLD AUTO: 0.06 K/UL
BASOPHILS NFR BLD AUTO: 0.6 %
BILIRUB SERPL-MCNC: 0.5 MG/DL
BUN SERPL-MCNC: 12 MG/DL
CALCIUM SERPL-MCNC: 10.2 MG/DL
CHLORIDE SERPL-SCNC: 102 MMOL/L
CHOLEST SERPL-MCNC: 200 MG/DL
CO2 SERPL-SCNC: 27 MMOL/L
CREAT SERPL-MCNC: 0.81 MG/DL
EGFR: 119 ML/MIN/1.73M2
EOSINOPHIL # BLD AUTO: 0.13 K/UL
EOSINOPHIL NFR BLD AUTO: 1.2 %
GLUCOSE SERPL-MCNC: 90 MG/DL
HCT VFR BLD CALC: 42.9 %
HDLC SERPL-MCNC: 55 MG/DL
HGB BLD-MCNC: 14.9 G/DL
IMM GRANULOCYTES NFR BLD AUTO: 0.4 %
LDLC SERPL CALC-MCNC: 132 MG/DL
LYMPHOCYTES # BLD AUTO: 3.27 K/UL
LYMPHOCYTES NFR BLD AUTO: 31.4 %
MAN DIFF?: NORMAL
MCHC RBC-ENTMCNC: 30.2 PG
MCHC RBC-ENTMCNC: 34.7 GM/DL
MCV RBC AUTO: 86.8 FL
MONOCYTES # BLD AUTO: 0.85 K/UL
MONOCYTES NFR BLD AUTO: 8.2 %
NEUTROPHILS # BLD AUTO: 6.07 K/UL
NEUTROPHILS NFR BLD AUTO: 58.2 %
NONHDLC SERPL-MCNC: 145 MG/DL
PLATELET # BLD AUTO: 256 K/UL
POTASSIUM SERPL-SCNC: 4.1 MMOL/L
PROT SERPL-MCNC: 7.2 G/DL
RBC # BLD: 4.94 M/UL
RBC # FLD: 14.1 %
SODIUM SERPL-SCNC: 139 MMOL/L
T4 FREE SERPL-MCNC: 1.5 NG/DL
TRIGL SERPL-MCNC: 63 MG/DL
TSH SERPL-ACNC: 10.3 UIU/ML
WBC # FLD AUTO: 10.42 K/UL

## 2023-03-07 LAB — LEVETIRACETAM SERPL-MCNC: 12.7 UG/ML

## 2023-06-19 ENCOUNTER — RX RENEWAL (OUTPATIENT)
Age: 33
End: 2023-06-19

## 2023-09-11 ENCOUNTER — RX RENEWAL (OUTPATIENT)
Age: 33
End: 2023-09-11

## 2023-11-07 ENCOUNTER — APPOINTMENT (OUTPATIENT)
Dept: NEUROLOGY | Facility: CLINIC | Age: 33
End: 2023-11-07
Payer: COMMERCIAL

## 2023-11-07 VITALS
WEIGHT: 159 LBS | SYSTOLIC BLOOD PRESSURE: 127 MMHG | DIASTOLIC BLOOD PRESSURE: 80 MMHG | HEIGHT: 66 IN | BODY MASS INDEX: 25.55 KG/M2 | TEMPERATURE: 98.2 F | OXYGEN SATURATION: 99 % | HEART RATE: 83 BPM

## 2023-11-07 PROCEDURE — 99213 OFFICE O/P EST LOW 20 MIN: CPT

## 2024-03-21 ENCOUNTER — RX RENEWAL (OUTPATIENT)
Age: 34
End: 2024-03-21

## 2024-03-21 RX ORDER — LEVETIRACETAM 750 MG/1
750 TABLET, FILM COATED ORAL TWICE DAILY
Qty: 60 | Refills: 5 | Status: ACTIVE | COMMUNITY
Start: 2018-09-19 | End: 1900-01-01

## 2024-06-20 ENCOUNTER — APPOINTMENT (OUTPATIENT)
Dept: FAMILY MEDICINE | Facility: CLINIC | Age: 34
End: 2024-06-20
Payer: COMMERCIAL

## 2024-06-20 VITALS
HEART RATE: 78 BPM | RESPIRATION RATE: 20 BRPM | SYSTOLIC BLOOD PRESSURE: 118 MMHG | WEIGHT: 182 LBS | DIASTOLIC BLOOD PRESSURE: 75 MMHG | HEIGHT: 66 IN | BODY MASS INDEX: 29.25 KG/M2

## 2024-06-20 DIAGNOSIS — E78.5 HYPERLIPIDEMIA, UNSPECIFIED: ICD-10-CM

## 2024-06-20 DIAGNOSIS — E03.9 HYPOTHYROIDISM, UNSPECIFIED: ICD-10-CM

## 2024-06-20 DIAGNOSIS — G40.409 OTHER GENERALIZED EPILEPSY AND EPILEPTIC SYNDROMES, NOT INTRACTABLE, W/OUT STATUS EPILEPTICUS: ICD-10-CM

## 2024-06-20 PROCEDURE — 99214 OFFICE O/P EST MOD 30 MIN: CPT

## 2024-06-20 PROCEDURE — 36415 COLL VENOUS BLD VENIPUNCTURE: CPT

## 2024-06-20 NOTE — HISTORY OF PRESENT ILLNESS
[de-identified] : Presents for BP check, labs, and general follow-up.  States feeling generally well; remains very active.  No new seizure activity - UTD with Neurology.

## 2024-06-21 LAB
ALBUMIN SERPL ELPH-MCNC: 4.8 G/DL
ALP BLD-CCNC: 71 U/L
ALT SERPL-CCNC: 29 U/L
ANION GAP SERPL CALC-SCNC: 13 MMOL/L
AST SERPL-CCNC: 25 U/L
BILIRUB SERPL-MCNC: 0.6 MG/DL
BUN SERPL-MCNC: 13 MG/DL
CALCIUM SERPL-MCNC: 10 MG/DL
CHLORIDE SERPL-SCNC: 101 MMOL/L
CHOLEST SERPL-MCNC: 218 MG/DL
CO2 SERPL-SCNC: 22 MMOL/L
CREAT SERPL-MCNC: 0.88 MG/DL
EGFR: 115 ML/MIN/1.73M2
ESTIMATED AVERAGE GLUCOSE: 111 MG/DL
FOLATE SERPL-MCNC: 9.9 NG/ML
GLUCOSE SERPL-MCNC: 94 MG/DL
HBA1C MFR BLD HPLC: 5.5 %
HCT VFR BLD CALC: 47.2 %
HDLC SERPL-MCNC: 47 MG/DL
HGB BLD-MCNC: 15.7 G/DL
LDLC SERPL CALC-MCNC: 161 MG/DL
MCHC RBC-ENTMCNC: 29.9 PG
MCHC RBC-ENTMCNC: 33.3 GM/DL
MCV RBC AUTO: 89.9 FL
NONHDLC SERPL-MCNC: 170 MG/DL
PLATELET # BLD AUTO: 257 K/UL
POTASSIUM SERPL-SCNC: 4.4 MMOL/L
PROT SERPL-MCNC: 7.3 G/DL
RBC # BLD: 5.25 M/UL
RBC # FLD: 14.6 %
SODIUM SERPL-SCNC: 136 MMOL/L
T4 FREE SERPL-MCNC: 1.6 NG/DL
TRIGL SERPL-MCNC: 51 MG/DL
TSH SERPL-ACNC: 7.72 UIU/ML
VIT B12 SERPL-MCNC: 810 PG/ML
WBC # FLD AUTO: 9.71 K/UL

## 2024-06-25 ENCOUNTER — NON-APPOINTMENT (OUTPATIENT)
Age: 34
End: 2024-06-25

## 2024-06-25 LAB — LEVETIRACETAM SERPL-MCNC: 6.4 UG/ML

## 2024-06-25 NOTE — PHYSICAL EXAM
[FreeTextEntry1] : Head:  Normocephalic Neck: Supple nontender no carotid bruits.    Mental Status:  Alert Oriented X3 Speech normal and no aphasia or dysarthria.  Cranial Nerves:  PERR,  Visual Fields full  EOMI no diplopia no ptosis no nystagmus, V through XII intact.  Motor:  No drift, normal strength tone and coordination and no focal atrophy. No abnormal movements. No dysmetria.  Normal rapid alternating movements.   DTRs: Symmetric and 2+.  Plantars flexor.  No Clonus.  Sensory: Intact.  Gait:  Normal including tandem walking heel toe walking and Rhomberg.

## 2024-06-25 NOTE — ASSESSMENT
[FreeTextEntry1] : Impression: This 33-year-old male patient has a seizure disorder generalized tonic-clonic type with an aura of feeling anxious and generalized paresthesia doing well without recurrence.  Neurological exam remains normal and Keppra serum level is in the therapeutic range.  Recommendations: Maintain Keppra 750 mg twice daily.  Office follow-up in 1 year.

## 2024-06-25 NOTE — HISTORY OF PRESENT ILLNESS
[FreeTextEntry1] : This patient is seen for an office visit.  Last seen on 11/7/2022.  He is doing well without any recurrent seizures while taking Keppra 750 mg twice daily.  Denies any side effects.  His last serum level on 3/3/2023 was 12.7 which is in the therapeutic range.  The level on 5/24/2022 was 50 and also therapeutic.  He continues to take medication for his thyroid which is being adjusted.

## 2024-09-27 NOTE — ED PROVIDER NOTE - NS ED ROS FT
[TextEntry] : Gen: Pleasant in no distress HEENT: Sclera non-icteric, conjunctiva non-injected; oropharynx clear w/o thrush;  Neck: supple w/o cervical LAD; no bruits CV: Regular rate and rhythm, no murmurs, rubs or gallops Lungs: right base rhonchi Extremities: no clubbing, cyanosis; trace 1+ L>R ankle edema Skin: no rash Psych: normal mood and affect Neuro:  non-focal Constitutional: (-) fever  (-)chills  (-)sweats  Eyes/ENT: (-) blurry vision, (-) epistaxis  (-)rhinorrhea   (-) sore throat    Cardiovascular: (-) chest pain, (-) palpitations (-) edema   Respiratory: (-) cough, (-) shortness of breath   Gastrointestinal: (-)nausea  (-)vomiting, (-) diarrhea  (-) abdominal pain   :  (-)dysuria, (-)frequency, (-)urgency, (-)hematuria  Musculoskeletal: (-) neck pain, (-) back pain, (-) joint pain  Integumentary: (-) rash, (-) edema  Neurological: (-) headache, (-) altered mental status  (+)LOC, seizure

## 2024-10-14 ENCOUNTER — RX RENEWAL (OUTPATIENT)
Age: 34
End: 2024-10-14

## 2024-11-07 ENCOUNTER — APPOINTMENT (OUTPATIENT)
Dept: NEUROLOGY | Facility: CLINIC | Age: 34
End: 2024-11-07
Payer: COMMERCIAL

## 2024-11-07 ENCOUNTER — NON-APPOINTMENT (OUTPATIENT)
Age: 34
End: 2024-11-07

## 2024-11-07 VITALS
WEIGHT: 182 LBS | OXYGEN SATURATION: 98 % | HEART RATE: 75 BPM | DIASTOLIC BLOOD PRESSURE: 79 MMHG | HEIGHT: 66 IN | SYSTOLIC BLOOD PRESSURE: 120 MMHG | BODY MASS INDEX: 29.25 KG/M2 | TEMPERATURE: 97.5 F

## 2024-11-07 DIAGNOSIS — G40.409 OTHER GENERALIZED EPILEPSY AND EPILEPTIC SYNDROMES, NOT INTRACTABLE, W/OUT STATUS EPILEPTICUS: ICD-10-CM

## 2024-11-07 PROCEDURE — 99214 OFFICE O/P EST MOD 30 MIN: CPT

## 2024-11-07 PROCEDURE — G2211 COMPLEX E/M VISIT ADD ON: CPT | Mod: NC

## 2025-06-12 ENCOUNTER — RX RENEWAL (OUTPATIENT)
Age: 35
End: 2025-06-12